# Patient Record
Sex: MALE | Race: WHITE | Employment: UNEMPLOYED | URBAN - METROPOLITAN AREA
[De-identification: names, ages, dates, MRNs, and addresses within clinical notes are randomized per-mention and may not be internally consistent; named-entity substitution may affect disease eponyms.]

---

## 2019-06-28 ENCOUNTER — OFFICE VISIT (OUTPATIENT)
Dept: PRIMARY CARE CLINIC | Age: 29
End: 2019-06-28

## 2019-06-28 VITALS
WEIGHT: 145 LBS | BODY MASS INDEX: 23.3 KG/M2 | HEIGHT: 66 IN | OXYGEN SATURATION: 97 % | SYSTOLIC BLOOD PRESSURE: 106 MMHG | TEMPERATURE: 97.8 F | HEART RATE: 75 BPM | DIASTOLIC BLOOD PRESSURE: 80 MMHG

## 2019-06-28 DIAGNOSIS — L65.9 NON-SCARRING HAIR LOSS: Primary | ICD-10-CM

## 2019-06-28 PROCEDURE — 99213 OFFICE O/P EST LOW 20 MIN: CPT | Performed by: FAMILY MEDICINE

## 2019-06-28 RX ORDER — DICYCLOMINE HYDROCHLORIDE 10 MG/1
CAPSULE ORAL
COMMUNITY
Start: 2008-02-09

## 2019-06-28 ASSESSMENT — PATIENT HEALTH QUESTIONNAIRE - PHQ9
1. LITTLE INTEREST OR PLEASURE IN DOING THINGS: 0
SUM OF ALL RESPONSES TO PHQ QUESTIONS 1-9: 0
SUM OF ALL RESPONSES TO PHQ QUESTIONS 1-9: 0
SUM OF ALL RESPONSES TO PHQ9 QUESTIONS 1 & 2: 0
2. FEELING DOWN, DEPRESSED OR HOPELESS: 0

## 2019-06-28 NOTE — PROGRESS NOTES
19     [facility]    : 1990 Sex: male   Age: 34 y.o. Chief Complaint   Patient presents with    Hair/Scalp Problem     alot of hair loss 6-9 months       Prior to Admission medications    Medication Sig Start Date End Date Taking? Authorizing Provider   dicyclomine (BENTYL) 10 MG capsule Take by mouth 08  Yes Historical Provider, MD          HPI: Patient is seen this morning with complaints of thinning of the hair. 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 Extensive discussion on other possibilities she prefers to hold off on any lab work at this time insurance reasons. Physically he has been very stable no additional complaints. Review of Systems     Review of systems unremarkable no additional complaints outside of the hair loss. Cardiac no complaints respiratory no complaints GI no complaints musculoskeletal no complaints. Current Outpatient Medications:     dicyclomine (BENTYL) 10 MG capsule, Take by mouth, Disp: , Rfl:     No Known Allergies    Social History     Tobacco Use    Smoking status: Never Smoker    Smokeless tobacco: Never Used   Substance Use Topics    Alcohol use: Not on file    Drug use: Not on file      No past surgical history on file. No family history on file. No past medical history on file. Vitals:    19 1054   BP: 106/80   Pulse: 75   Temp: 97.8 °F (36.6 °C)   SpO2: 97%   Weight: 145 lb (65.8 kg)   Height: 5' 6\" (1.676 m)     BP Readings from Last 3 Encounters:   19 106/80        Physical Exam   Physical exam findings are all stable as noted. Vitals well controlled. Heart regular rate and rhythm. Lungs are clear bilaterally. Abdomen benign. Mild hair loss related to probable male pattern baldness. If persistent symptoms we will follow-up. For now treatment with biotin-based shampoos. Persistent symptoms or worsening symptoms will follow up for some labs.           Plan Per Assessment:  Nancy Leija was seen today for hair/scalp problem. Diagnoses and all orders for this visit:    Non-scarring hair loss            Return if symptoms worsen or fail to improve. Sudheer Ahumada DO    Note was generated with the assistance of voice recognition software. Document was reviewed however may contain grammatical errors.